# Patient Record
Sex: FEMALE | Race: WHITE | NOT HISPANIC OR LATINO | URBAN - METROPOLITAN AREA
[De-identification: names, ages, dates, MRNs, and addresses within clinical notes are randomized per-mention and may not be internally consistent; named-entity substitution may affect disease eponyms.]

---

## 2020-06-09 ENCOUNTER — TELEPHONE (OUTPATIENT)
Dept: ORTHOPEDICS | Facility: CLINIC | Age: 70
End: 2020-06-09

## 2020-06-09 NOTE — TELEPHONE ENCOUNTER
"Pt reports DOI 6/4/20 pt fell with outstretched left arm.  Pt reports she broke both bones in her forearm.  Informed pt Dr. Fajardo does not have a sooner appointment available at this time.  Informed pt due to her injury I would like her to contact Dr. Tafoya's office to see if they recommend another provider to help address her recent arm injury.  Pt verbalized understanding and stated "I will call them now."  "

## 2020-06-09 NOTE — TELEPHONE ENCOUNTER
----- Message from Kassy Ashton MA sent at 6/9/2020  9:49 AM CDT -----  Contact: gerry stokes referral, fall fx left   Call back